# Patient Record
Sex: FEMALE | Race: WHITE | NOT HISPANIC OR LATINO | Employment: FULL TIME | URBAN - METROPOLITAN AREA
[De-identification: names, ages, dates, MRNs, and addresses within clinical notes are randomized per-mention and may not be internally consistent; named-entity substitution may affect disease eponyms.]

---

## 2023-08-16 ENCOUNTER — OFFICE VISIT (OUTPATIENT)
Dept: URGENT CARE | Facility: CLINIC | Age: 38
End: 2023-08-16
Payer: COMMERCIAL

## 2023-08-16 VITALS
OXYGEN SATURATION: 99 % | HEART RATE: 101 BPM | WEIGHT: 194 LBS | SYSTOLIC BLOOD PRESSURE: 118 MMHG | RESPIRATION RATE: 18 BRPM | DIASTOLIC BLOOD PRESSURE: 80 MMHG | TEMPERATURE: 98.2 F

## 2023-08-16 DIAGNOSIS — S61.216A LACERATION OF RIGHT LITTLE FINGER WITHOUT FOREIGN BODY WITHOUT DAMAGE TO NAIL, INITIAL ENCOUNTER: Primary | ICD-10-CM

## 2023-08-16 PROCEDURE — 12001 RPR S/N/AX/GEN/TRNK 2.5CM/<: CPT

## 2023-08-16 PROCEDURE — 99203 OFFICE O/P NEW LOW 30 MIN: CPT

## 2023-08-16 RX ORDER — FAMOTIDINE 10 MG
10 TABLET ORAL DAILY
COMMUNITY

## 2023-08-16 NOTE — PATIENT INSTRUCTIONS
Keep finger clean/dry while sutures are in place. Wear splint during the day to keep finger from bending. May apply topical antibiotic ointment (bacitracin, neosporin) daily while sutures are in place and may take tylenol as needed for pain. Go to your nearest urgent care, ER, PCP office for suture removal of 3 sutures in 7-10 days. Report to the ER sooner if symptoms worsen or develop fever or worsening pain, redness, or swelling at site of injury.

## 2023-08-16 NOTE — PROGRESS NOTES
[facility]        NAME: Teresa Erazo is a 41 y.o. female  : 1985    MRN: [id number]  DATE: 2023  TIME: 12:58 PM    Assessment and Plan   Laceration of right little finger without foreign body without damage to nail, initial encounter [S61.216A]  1. Laceration of right little finger without foreign body without damage to nail, initial encounter          3 sutures and 2 steri strips placed to right little finger without incident. Finger placed in finger splint. Patient is out of town and is returning home next Monday. She will have sutures removed at local urgent care/PCP/ER once she returns home. Patient Instructions     Keep finger clean/dry while sutures are in place. Wear splint during the day to keep finger from bending. May apply topical antibiotic ointment (bacitracin, neosporin) daily while sutures are in place and may take tylenol as needed for pain. Go to your nearest urgent care, ER, PCP office for suture removal of 3 sutures in 7-10 days. Report to the ER sooner if symptoms worsen or develop fever or worsening pain, redness, or swelling at site of injury. Chief Complaint     Chief Complaint   Patient presents with   • Cut on Finger      Pt states she was doing dishes and a dish broke and cut her right pinky finger. This happened at 8am this morning. Last tetanus 2021 and one to be given next week          History of Present Illness       41year old female presents for evaluation of laceration on her right pinky finger that occurred around 8 AM this morning when she dropped a dish on her finger. Her last tetanus was in . Hand Pain   The incident occurred 3 to 6 hours ago. The incident occurred at home. The injury mechanism was a direct blow. The pain is present in the right fingers. The quality of the pain is described as aching. The pain does not radiate. The pain is at a severity of 5/10. The pain is mild.  The pain has been constant since the incident. Pertinent negatives include no chest pain, numbness or tingling. The symptoms are aggravated by palpation and movement. Treatments tried: direct pressure. The treatment provided mild relief. Review of Systems   Review of Systems   Constitutional: Negative for activity change, appetite change, chills, fatigue and fever. Respiratory: Negative for cough, chest tightness and shortness of breath. Cardiovascular: Negative for chest pain and palpitations. Gastrointestinal: Negative for abdominal pain, constipation, diarrhea, nausea and vomiting. Skin: Positive for wound. Negative for color change, pallor and rash. Allergic/Immunologic: Negative for environmental allergies and food allergies. Neurological: Negative for dizziness, tingling, light-headedness, numbness and headaches. Current Medications       Current Outpatient Medications:   •  famotidine (PEPCID) 10 mg tablet, Take 10 mg by mouth daily, Disp: , Rfl:     Current Allergies     Allergies as of 08/16/2023   • (No Known Allergies)            The following portions of the patient's history were reviewed and updated as appropriate: allergies, current medications, past family history, past medical history, past social history, past surgical history and problem list.     History reviewed. No pertinent past medical history. Past Surgical History:   Procedure Laterality Date   • APPENDECTOMY         History reviewed. No pertinent family history. Medications have been verified. Objective   /80   Pulse 101   Temp 98.2 °F (36.8 °C)   Resp 18   Wt 88 kg (194 lb)   SpO2 99%        Physical Exam     Physical Exam  Vitals and nursing note reviewed. Constitutional:       General: She is awake. Appearance: Normal appearance. She is well-developed and normal weight. HENT:      Head: Normocephalic and atraumatic.       Right Ear: Hearing normal.      Left Ear: Hearing normal.      Mouth/Throat:      Lips: Pink.      Mouth: Mucous membranes are moist.   Cardiovascular:      Rate and Rhythm: Normal rate and regular rhythm. Pulses: Normal pulses. Radial pulses are 2+ on the right side and 2+ on the left side. Heart sounds: Normal heart sounds. Pulmonary:      Effort: Pulmonary effort is normal.      Breath sounds: Normal breath sounds. Musculoskeletal:         General: Tenderness and signs of injury present. Cervical back: Normal range of motion and neck supple. Skin:     General: Skin is warm and dry. Capillary Refill: Capillary refill takes less than 2 seconds. Findings: Signs of injury, laceration and wound present. No abrasion, abscess, bruising, ecchymosis, erythema or rash. Comments: 2.5 cm laceration to right little finger in u-shape around nail bed. No damage to nail. Bleeding not controlled. Neurological:      Mental Status: She is alert and oriented to person, place, and time. GCS: GCS eye subscore is 4. GCS verbal subscore is 5. GCS motor subscore is 6. Psychiatric:         Mood and Affect: Mood normal.         Behavior: Behavior normal. Behavior is cooperative. Thought Content: Thought content normal.         Judgment: Judgment normal.     Universal Protocol:  Consent: Verbal consent obtained.   Risks and benefits: risks, benefits and alternatives were discussed  Consent given by: patient  Patient understanding: patient states understanding of the procedure being performed  Patient consent: the patient's understanding of the procedure matches consent given  Required items: required blood products, implants, devices, and special equipment available  Patient identity confirmed: verbally with patient    Laceration repair    Date/Time: 8/16/2023 12:15 PM    Performed by: LOIDA Stallings  Authorized by: LOIDA Stallings  Body area: upper extremity  Location details: right small finger  Foreign bodies: no foreign bodies  Tendon involvement: none  Nerve involvement: none  Vascular damage: no  Anesthesia: local infiltration    Anesthesia:  Local Anesthetic: lidocaine 2% without epinephrine    Sedation:  Patient sedated: no      Wound Dehiscence:  Superficial Wound Dehiscence: simple closure      Procedure Details:  Preparation: Patient was prepped and draped in the usual sterile fashion. Irrigation solution: saline  Irrigation method: syringe  Amount of cleaning: standard  Debridement: none  Degree of undermining: none  Skin closure: 4-0 nylon, Steri-Strips and glue  Number of sutures: 3  Technique: simple  Approximation: close  Approximation difficulty: simple  Dressing: 4x4 sterile gauze, gauze roll and splint  Patient tolerance: patient tolerated the procedure well with no immediate complications  Comments: 3 sutures and 2 steri strips placed     Orthopedic injury treatment    Date/Time: 8/16/2023 12:15 PM    Performed by: LOIDA Herman  Authorized by: LOIDA Herman    Patient Location:  Bedside  [place] Protocol:  Consent: Verbal consent obtained. Risks and benefits: risks, benefits and alternatives were discussed  Consent given by: patient  Patient understanding: patient states understanding of the procedure being performed  Patient consent: the patient's understanding of the procedure matches consent given  Required items: required blood products, implants, devices, and special equipment available  Patient identity confirmed: verbally with patient      Injury location:  Finger  Location details:  Right little finger  Injury type:   Soft tissue  Neurovascular status: Neurovascularly intact    Distal perfusion: normal    Neurological function: normal    Range of motion: normal    Local anesthesia used?: No    General anesthesia used?: No    Immobilization:  Splint  Splint type:  Finger splint, static  Supplies used:  Aluminum splint  Neurovascular status: Neurovascularly intact    Distal perfusion: normal Neurological function: normal    Range of motion: normal    Patient tolerance:  Patient tolerated the procedure well with no immediate complications